# Patient Record
Sex: MALE | Race: WHITE | HISPANIC OR LATINO | URBAN - METROPOLITAN AREA
[De-identification: names, ages, dates, MRNs, and addresses within clinical notes are randomized per-mention and may not be internally consistent; named-entity substitution may affect disease eponyms.]

---

## 2024-11-11 VITALS
RESPIRATION RATE: 16 BRPM | SYSTOLIC BLOOD PRESSURE: 111 MMHG | HEART RATE: 52 BPM | WEIGHT: 184.97 LBS | DIASTOLIC BLOOD PRESSURE: 69 MMHG | OXYGEN SATURATION: 94 % | HEIGHT: 68 IN

## 2024-11-11 RX ORDER — CHLORHEXIDINE GLUCONATE 40 MG/ML
1 SOLUTION TOPICAL ONCE
Refills: 0 | Status: DISCONTINUED | OUTPATIENT
Start: 2024-11-14 | End: 2024-11-15

## 2024-11-11 NOTE — H&P ADULT - HISTORY OF PRESENT ILLNESS
Cardiologist: Dr. Crowder  Escort:  Pharmacy:    65 yo M, active smoker, with a PMH of HTN, HLD, hx of VT (non-sustained 7 beats on OP monitor), chronic systolic CHF (EF 25% per stress) who presented to outpatient cardiologist Dr. Crowder with complaints of L sided chest tightness, lasting 10-15 minutes after resting, experienced on exertion (painting). CP is not experienced at rest and a/w SEPULVEDA to 2 blocks (previously 4-5), b/l leg/feet swelling, occasional palpitations. Denies dizziness, diaphoresis, orthopnea/PND, BRBPR, hematuria, melena. Exercise stress echo 10/8/24: LVEF 25%, evidence of infarction/scar at target HR achieved through exercise protocol. ECG (per MD note): SR, R axis deviation, poor R wave progression V1-4 diffuse, no specific ST abnormalities. In light of patient's risk factors, CCS angina class III sx and abnormal exercise stress echo, patient is referred for cardiac catheterization with possible intervention if clinically indicated.  Cardiologist: Dr. Crowder  Escort: Daughter  Pharmacy: [facility]    63 yo M, active smoker, with a PMH of HTN, HLD, CAD with prior stents, hx of VT (non-sustained 7 beats on OP monitor), chronic systolic CHF (EF 25% per stress) who presented to outpatient cardiologist Dr. Crowder with complaints of L sided chest tightness, lasting 10-15 minutes after resting, experienced on exertion (painting). CP is not experienced at rest and a/w SEPULVEDA to 2 blocks (previously 4-5), b/l leg/feet swelling, occasional palpitations. Denies dizziness, diaphoresis, orthopnea/PND, BRBPR, hematuria, melena. Exercise Stress Echo 10/8/24: LVEF 25%, evidence of infarction/scar at target HR achieved through exercise protocol. ECG (per MD note): SR, R axis deviation, poor R wave progression V1-4 diffuse, no specific ST abnormalities. In light of patient's risk factors, CCS angina class III sx and abnormal exercise stress echo, patient is referred for cardiac catheterization with possible intervention if clinically indicated.  Cardiologist: Dr. Crowder  Escort: Daughter  Pharmacy: [facility]    63 yo M, active smoker, with a PMH of HTN, HLD, CAD with prior stents, hx of VT (non-sustained 7 beats on OP monitor), chronic systolic CHF (EF 25% per stress) who presented to outpatient cardiologist Dr. Crowder with complaints of L sided chest tightness, lasting 10-15 minutes after resting, experienced on exertion (painting). CP is not experienced at rest and a/w SEPULVEDA to 2 blocks (previously 4-5), b/l leg/feet swelling, occasional palpitations. Denies dizziness, diaphoresis, orthopnea/PND, BRBPR, hematuria, melena. Exercise Stress Echo 10/8/24: LVEF 25%, evidence of infarction/scar at target HR achieved through exercise protocol. ECG (per MD note): SR, R axis deviation, poor R wave progression V1-4 diffuse, no specific ST abnormalities. In light of patient's risk factors, CCS angina class III sx and abnormal exercise stress echo, patient is referred for cardiac catheterization with possible intervention if clinically indicated.

## 2024-11-11 NOTE — H&P ADULT - NSHPLABSRESULTS_GEN_ALL_CORE
Routed to PRINCESS Pradhan for review and recommendation.     13.0   6.04  )-----------( 176      ( 14 Nov 2024 11:07 )             40.4       11-14    137  |  103  |  22  ----------------------------<  98  4.4   |  26  |  0.96    Ca    8.8      14 Nov 2024 11:07    TPro  6.8  /  Alb  4.3  /  TBili  0.5  /  DBili  x   /  AST  17  /  ALT  15  /  AlkPhos  108  11-14      PT/INR - ( 14 Nov 2024 11:07 )   PT: 11.9 sec;   INR: 1.02          PTT - ( 14 Nov 2024 11:07 )  PTT:35.0 sec    CARDIAC MARKERS ( 14 Nov 2024 11:07 )  x     / x     / x     / x     / 7.2 ng/mL        Urinalysis Basic - ( 14 Nov 2024 11:07 )    Color: x / Appearance: x / SG: x / pH: x  Gluc: 98 mg/dL / Ketone: x  / Bili: x / Urobili: x   Blood: x / Protein: x / Nitrite: x   Leuk Esterase: x / RBC: x / WBC x   Sq Epi: x / Non Sq Epi: x / Bacteria: x

## 2024-11-11 NOTE — H&P ADULT - ASSESSMENT
63 yo M, active smoker, with a PMH of HTN, HLD, CAD with prior stents, hx of VT (non-sustained 7 beats on OP monitor), chronic systolic CHF (EF 25% per stress) who presents for [facility] with intervention if clinically indicated.    EKG: NSR with TWI in II, III, AVL, V4-V6			  ASA: III  Mallampati class: III   Anginal Class: III    -No Known Allergies    -H/H = 13.0/40.4  . Pt denies BRBPR, hematuria, hematochezia, melena. Pt endorses compliance w/ home aspirin stating last dose was yesterday 11/13/24.  Pt loaded w/ ASA 81 mg x1 and Plavix 600 mg x1  -BUN/Cr = 22/0.96  . EF=25%. +JVD, pt not given fluids as communicated with fellows. B/L lung sounds with wheezes, pt given Duoneb 3ML inh x1 prior to procedure.    Sedation Plan:   Moderate  Patient Is Suitable Candidate For Sedation?     Yes    Risks & benefits of procedure and alternative therapy have been explained to the patient using  #[id number] including but not limited to: allergic reaction, bleeding with possible need for blood transfusion, infection, renal and vascular compromise, limb damage, arrhythmia, stroke, vessel dissection/perforation, myocardial infarction, and emergent CABG. Informed consent obtained at bedside and included in chart.

## 2024-11-11 NOTE — H&P ADULT - NSICDXPASTMEDICALHX_GEN_ALL_CORE_FT
PAST MEDICAL HISTORY:  Chronic systolic congestive heart failure     HLD (hyperlipidemia)     HTN (hypertension)     Smoker

## 2024-11-14 ENCOUNTER — INPATIENT (INPATIENT)
Facility: HOSPITAL | Age: 64
LOS: 0 days | Discharge: HOME | End: 2024-11-15
Attending: STUDENT IN AN ORGANIZED HEALTH CARE EDUCATION/TRAINING PROGRAM | Admitting: STUDENT IN AN ORGANIZED HEALTH CARE EDUCATION/TRAINING PROGRAM
Payer: MEDICARE

## 2024-11-14 LAB
A1C WITH ESTIMATED AVERAGE GLUCOSE RESULT: 5.7 % — HIGH (ref 4–5.6)
ADD ON TEST-SPECIMEN IN LAB: SIGNIFICANT CHANGE UP
ALBUMIN SERPL ELPH-MCNC: 4.3 G/DL — SIGNIFICANT CHANGE UP (ref 3.3–5)
ALP SERPL-CCNC: 108 U/L — SIGNIFICANT CHANGE UP (ref 40–120)
ALT FLD-CCNC: 15 U/L — SIGNIFICANT CHANGE UP (ref 10–45)
ANION GAP SERPL CALC-SCNC: 8 MMOL/L — SIGNIFICANT CHANGE UP (ref 5–17)
APTT BLD: 35 SEC — SIGNIFICANT CHANGE UP (ref 24.5–35.6)
AST SERPL-CCNC: 17 U/L — SIGNIFICANT CHANGE UP (ref 10–40)
BASOPHILS # BLD AUTO: 0.03 K/UL — SIGNIFICANT CHANGE UP (ref 0–0.2)
BASOPHILS NFR BLD AUTO: 0.5 % — SIGNIFICANT CHANGE UP (ref 0–2)
BILIRUB SERPL-MCNC: 0.5 MG/DL — SIGNIFICANT CHANGE UP (ref 0.2–1.2)
BUN SERPL-MCNC: 22 MG/DL — SIGNIFICANT CHANGE UP (ref 7–23)
CALCIUM SERPL-MCNC: 8.8 MG/DL — SIGNIFICANT CHANGE UP (ref 8.4–10.5)
CHLORIDE SERPL-SCNC: 103 MMOL/L — SIGNIFICANT CHANGE UP (ref 96–108)
CHOLEST SERPL-MCNC: 189 MG/DL — SIGNIFICANT CHANGE UP
CK MB CFR SERPL CALC: 7.2 NG/ML — HIGH (ref 0–6.7)
CK SERPL-CCNC: 232 U/L — HIGH (ref 30–200)
CO2 SERPL-SCNC: 26 MMOL/L — SIGNIFICANT CHANGE UP (ref 22–31)
CREAT SERPL-MCNC: 0.96 MG/DL — SIGNIFICANT CHANGE UP (ref 0.5–1.3)
EGFR: 87 ML/MIN/1.73M2 — SIGNIFICANT CHANGE UP
EOSINOPHIL # BLD AUTO: 0.15 K/UL — SIGNIFICANT CHANGE UP (ref 0–0.5)
EOSINOPHIL NFR BLD AUTO: 2.5 % — SIGNIFICANT CHANGE UP (ref 0–6)
ESTIMATED AVERAGE GLUCOSE: 117 MG/DL — HIGH (ref 68–114)
GLUCOSE SERPL-MCNC: 98 MG/DL — SIGNIFICANT CHANGE UP (ref 70–99)
HCT VFR BLD CALC: 40.4 % — SIGNIFICANT CHANGE UP (ref 39–50)
HDLC SERPL-MCNC: 46 MG/DL — SIGNIFICANT CHANGE UP
HGB BLD-MCNC: 13 G/DL — SIGNIFICANT CHANGE UP (ref 13–17)
IMM GRANULOCYTES NFR BLD AUTO: 0.2 % — SIGNIFICANT CHANGE UP (ref 0–0.9)
INR BLD: 1.02 — SIGNIFICANT CHANGE UP (ref 0.85–1.16)
LIPID PNL WITH DIRECT LDL SERPL: 117 MG/DL — HIGH
LYMPHOCYTES # BLD AUTO: 1.99 K/UL — SIGNIFICANT CHANGE UP (ref 1–3.3)
LYMPHOCYTES # BLD AUTO: 32.9 % — SIGNIFICANT CHANGE UP (ref 13–44)
MCHC RBC-ENTMCNC: 30.6 PG — SIGNIFICANT CHANGE UP (ref 27–34)
MCHC RBC-ENTMCNC: 32.2 G/DL — SIGNIFICANT CHANGE UP (ref 32–36)
MCV RBC AUTO: 95.1 FL — SIGNIFICANT CHANGE UP (ref 80–100)
MONOCYTES # BLD AUTO: 0.65 K/UL — SIGNIFICANT CHANGE UP (ref 0–0.9)
MONOCYTES NFR BLD AUTO: 10.8 % — SIGNIFICANT CHANGE UP (ref 2–14)
NEUTROPHILS # BLD AUTO: 3.21 K/UL — SIGNIFICANT CHANGE UP (ref 1.8–7.4)
NEUTROPHILS NFR BLD AUTO: 53.1 % — SIGNIFICANT CHANGE UP (ref 43–77)
NON HDL CHOLESTEROL: 143 MG/DL — HIGH
NRBC # BLD: 0 /100 WBCS — SIGNIFICANT CHANGE UP (ref 0–0)
PLATELET # BLD AUTO: 176 K/UL — SIGNIFICANT CHANGE UP (ref 150–400)
POTASSIUM SERPL-MCNC: 4.4 MMOL/L — SIGNIFICANT CHANGE UP (ref 3.5–5.3)
POTASSIUM SERPL-SCNC: 4.4 MMOL/L — SIGNIFICANT CHANGE UP (ref 3.5–5.3)
PROT SERPL-MCNC: 6.8 G/DL — SIGNIFICANT CHANGE UP (ref 6–8.3)
PROTHROM AB SERPL-ACNC: 11.9 SEC — SIGNIFICANT CHANGE UP (ref 9.9–13.4)
RBC # BLD: 4.25 M/UL — SIGNIFICANT CHANGE UP (ref 4.2–5.8)
RBC # FLD: 14.1 % — SIGNIFICANT CHANGE UP (ref 10.3–14.5)
SODIUM SERPL-SCNC: 137 MMOL/L — SIGNIFICANT CHANGE UP (ref 135–145)
TRIGL SERPL-MCNC: 144 MG/DL — SIGNIFICANT CHANGE UP
WBC # BLD: 6.04 K/UL — SIGNIFICANT CHANGE UP (ref 3.8–10.5)
WBC # FLD AUTO: 6.04 K/UL — SIGNIFICANT CHANGE UP (ref 3.8–10.5)

## 2024-11-14 PROCEDURE — 99152 MOD SED SAME PHYS/QHP 5/>YRS: CPT

## 2024-11-14 PROCEDURE — 93458 L HRT ARTERY/VENTRICLE ANGIO: CPT | Mod: 26,59

## 2024-11-14 PROCEDURE — 92928 PRQ TCAT PLMT NTRAC ST 1 LES: CPT | Mod: RI

## 2024-11-14 PROCEDURE — 0523T NTRAPX C FFR W/3D FUNCJL MAP: CPT

## 2024-11-14 RX ORDER — INFLUENZ VIR VAC TV P-SURF2003 15MCG/.5ML
0.5 SYRINGE (ML) INTRAMUSCULAR ONCE
Refills: 0 | Status: DISCONTINUED | OUTPATIENT
Start: 2024-11-14 | End: 2024-11-15

## 2024-11-14 RX ORDER — DAPAGLIFLOZIN 10 MG/1
1 TABLET, FILM COATED ORAL
Refills: 0 | DISCHARGE

## 2024-11-14 RX ORDER — ASPIRIN/MAG CARB/ALUMINUM AMIN 325 MG
81 TABLET ORAL DAILY
Refills: 0 | Status: DISCONTINUED | OUTPATIENT
Start: 2024-11-15 | End: 2024-11-15

## 2024-11-14 RX ORDER — CLOPIDOGREL 75 MG/1
600 TABLET ORAL ONCE
Refills: 0 | Status: COMPLETED | OUTPATIENT
Start: 2024-11-14 | End: 2024-11-14

## 2024-11-14 RX ORDER — CLOPIDOGREL 75 MG/1
75 TABLET ORAL DAILY
Refills: 0 | Status: DISCONTINUED | OUTPATIENT
Start: 2024-11-15 | End: 2024-11-15

## 2024-11-14 RX ORDER — ASPIRIN/MAG CARB/ALUMINUM AMIN 325 MG
81 TABLET ORAL ONCE
Refills: 0 | Status: COMPLETED | OUTPATIENT
Start: 2024-11-14 | End: 2024-11-14

## 2024-11-14 RX ORDER — ICOSAPENT ETHYL 1 G/1
2 CAPSULE, LIQUID FILLED ORAL
Refills: 0 | DISCHARGE

## 2024-11-14 RX ORDER — CARVEDILOL 25 MG/1
3.12 TABLET, FILM COATED ORAL EVERY 12 HOURS
Refills: 0 | Status: DISCONTINUED | OUTPATIENT
Start: 2024-11-14 | End: 2024-11-15

## 2024-11-14 RX ORDER — IPRATROPIUM BROMIDE AND ALBUTEROL SULFATE .5; 2.5 MG/3ML; MG/3ML
3 SOLUTION RESPIRATORY (INHALATION) EVERY 6 HOURS
Refills: 0 | Status: DISCONTINUED | OUTPATIENT
Start: 2024-11-14 | End: 2024-11-15

## 2024-11-14 RX ORDER — SODIUM CHLORIDE 9 MG/ML
1000 INJECTION, SOLUTION INTRAMUSCULAR; INTRAVENOUS; SUBCUTANEOUS
Refills: 0 | Status: DISCONTINUED | OUTPATIENT
Start: 2024-11-14 | End: 2024-11-15

## 2024-11-14 RX ORDER — AMLODIPINE BESYLATE 10 MG
1 TABLET ORAL
Refills: 0 | DISCHARGE

## 2024-11-14 RX ORDER — SACUBITRIL AND VALSARTAN 97; 103 MG/1; MG/1
1 TABLET, FILM COATED ORAL
Refills: 0 | DISCHARGE

## 2024-11-14 RX ORDER — SACUBITRIL AND VALSARTAN 97; 103 MG/1; MG/1
1 TABLET, FILM COATED ORAL EVERY 12 HOURS
Refills: 0 | Status: DISCONTINUED | OUTPATIENT
Start: 2024-11-14 | End: 2024-11-15

## 2024-11-14 RX ADMIN — SODIUM CHLORIDE 75 MILLILITER(S): 9 INJECTION, SOLUTION INTRAMUSCULAR; INTRAVENOUS; SUBCUTANEOUS at 17:12

## 2024-11-14 RX ADMIN — CLOPIDOGREL 600 MILLIGRAM(S): 75 TABLET ORAL at 12:28

## 2024-11-14 RX ADMIN — Medication 40 MILLIGRAM(S): at 21:53

## 2024-11-14 RX ADMIN — IPRATROPIUM BROMIDE AND ALBUTEROL SULFATE 3 MILLILITER(S): .5; 2.5 SOLUTION RESPIRATORY (INHALATION) at 13:13

## 2024-11-14 RX ADMIN — CARVEDILOL 3.12 MILLIGRAM(S): 25 TABLET, FILM COATED ORAL at 19:29

## 2024-11-14 RX ADMIN — SODIUM CHLORIDE 75 MILLILITER(S): 9 INJECTION, SOLUTION INTRAMUSCULAR; INTRAVENOUS; SUBCUTANEOUS at 18:12

## 2024-11-14 RX ADMIN — Medication 81 MILLIGRAM(S): at 12:28

## 2024-11-14 RX ADMIN — SACUBITRIL AND VALSARTAN 1 TABLET(S): 97; 103 TABLET, FILM COATED ORAL at 19:29

## 2024-11-14 RX ADMIN — IPRATROPIUM BROMIDE AND ALBUTEROL SULFATE 3 MILLILITER(S): .5; 2.5 SOLUTION RESPIRATORY (INHALATION) at 19:30

## 2024-11-14 NOTE — PATIENT PROFILE ADULT - FALL HARM RISK - HARM RISK INTERVENTIONS

## 2024-11-14 NOTE — DISCHARGE NOTE PROVIDER - HOSPITAL COURSE
**INCOMPLETE**    64M, active smoker, with a PMH of HTN, HLD, CAD with prior stents, hx of VT (non-sustained 7 beats on OP monitor), chronic systolic CHF (EF 25% per stress) who initially presented to his outpatient cardiologist c/o CP a/w SEPULVEDA, B/L LE edema, and palpitations, now presented to [facility] for LHC with intervention if clinically indicated.    Past Testing:   - Exercise Stress Echo 10/8/24: LVEF 25%, evidence of infarction/scar at target HR achieved through exercise protocol.   - ECG (per MD note): SR, R axis deviation, poor R wave progression V1-4 diffuse, no specific ST abnormalities.    Pt now s/p cardiac cath (11/14/24): BILL x 1 Ramus 80%. Otherwise MLI. EDP 6. ACCESS: RTR. IC/fellow: Dr. Crowder/Dr. Clayton.     Pt admitted to cardiac tele for further monitoring overnight. Pt seen and examined at bedside with no acute complaints or events overnight. VSS. Physical exam unremarkable. Right radial access site stable - no hematoma, c/d/i, radial pulse intact to baseline. Labs and telemetry reviewed. Pt stable for discharge as discussed with Dr. Solis. Pt advised to f/u with Dr. Crowder in 1-2 weeks.     GLP-1 receptor agonist/SGLT2 inhibitor meds discussed w/ patients and encouraged to discuss further with PMD or Endocrinologist at next visit.  Pt discharge copies detail cardiovascular history, medications, testing/treatments, OR patient has created a patient portal account and instructed to provide their records at their 1st appointment. Counseled on the importance of smoking cessation.    Cardiac Rehab (Post PCI):            *Education on benefits of Cardiac Rehab provided to patient: YES         *Referral and Prescription Given for Cardiac Rehab: YES         *Pt given list of locations & instructed to contact their insurance company to review list of participating providers    Discharge medications:  - DAPT: ASA 81 mg PO qd/Plavix 75 mg PO qd  - Statin: atorvastatin 40 mg PO qhs ()  - carvedilol 3.125 mg PO BID  - Vascepa 1 g (2 cap(s) BID)  - Entresto 24 mg-26 mg PO BID   **INCOMPLETE**    64M, active smoker, with a PMH of HTN, HLD, CAD with prior stents, hx of VT (non-sustained 7 beats on OP monitor), chronic systolic CHF (EF 25% per stress) who initially presented to his outpatient cardiologist c/o CP a/w SEPULVEDA, B/L LE edema, and palpitations, now presented to [facility] for LHC with intervention if clinically indicated.    Past Testing:   - Exercise Stress Echo 10/8/24: LVEF 25%, evidence of infarction/scar at target HR achieved through exercise protocol.   - ECG (per MD note): SR, R axis deviation, poor R wave progression V1-4 diffuse, no specific ST abnormalities.    Pt now s/p cardiac cath (11/14/24): BILL x 1 Ramus 80%. Otherwise MLI. EDP 6. ACCESS: RTR. IC/fellow: Dr. Crowder/Dr. Clayton.     Pt admitted to cardiac tele for further monitoring overnight. Pt seen and examined at bedside with no acute complaints or events overnight. VSS. Physical exam unremarkable. Right radial access site stable - no hematoma, c/d/i, radial pulse intact to baseline. Labs and telemetry reviewed. Pt stable for discharge as discussed with Dr. Solis. Pt advised to f/u with Dr. Crowder in 1-2 weeks.     GLP-1 receptor agonist/SGLT2 inhibitor meds discussed w/ patients and encouraged to discuss further with PMD or Endocrinologist at next visit.  Pt discharge copies detail cardiovascular history, medications, testing/treatments, OR patient has created a patient portal account and instructed to provide their records at their 1st appointment. Counseled on the importance of smoking cessation.    Cardiac Rehab (Post PCI):            *Education on benefits of Cardiac Rehab provided to patient: YES         *Referral and Prescription Given for Cardiac Rehab: YES         *Pt given list of locations & instructed to contact their insurance company to review list of participating providers    Discharge medications:  - DAPT: ASA 81 mg PO qd/Plavix 75 mg PO qd  - Statin: start atorvastatin 40 mg PO qhs ()  - carvedilol 3.125 mg PO BID  - Vascepa 1 g (2 cap(s) BID)  - Entresto 24 mg-26 mg PO BID   **INCOMPLETE**    64M, active smoker, with a PMH of HTN, HLD, CAD with prior stents, hx of VT (non-sustained 7 beats on OP monitor), chronic systolic CHF (EF 25% per stress) who initially presented to his outpatient cardiologist c/o CP a/w SEPULVEDA, B/L LE edema, and palpitations, now presented to [facility] for LHC with intervention if clinically indicated.    Past Testing:   - Exercise Stress Echo 10/8/24: LVEF 25%, evidence of infarction/scar at target HR achieved through exercise protocol.   - ECG (per MD note): SR, R axis deviation, poor R wave progression V1-4 diffuse, no specific ST abnormalities.    Pt now s/p cardiac cath (11/14/24): BILL x 1 Ramus 80%. Otherwise MLI. EDP 6. ACCESS: RTR. IC/fellow: Dr. Crowder/Dr. Clayton.     Patient was noted to have some ventricular ectopy on tele review. Increased BB to coreg 6.25mg bid for this reason.      Pt admitted to cardiac tele for further monitoring overnight. Pt seen and examined at bedside with no acute complaints or events overnight. VSS. Physical exam unremarkable. Right radial access site stable - no hematoma, c/d/i, radial pulse intact to baseline. Labs and telemetry reviewed. Pt stable for discharge as discussed with Dr. Solis. Pt advised to f/u with Dr. Crowder in 1-2 weeks.     GLP-1 receptor agonist/SGLT2 inhibitor meds discussed w/ patients and encouraged to discuss further with PMD or Endocrinologist at next visit.  Pt discharge copies detail cardiovascular history, medications, testing/treatments, OR patient has created a patient portal account and instructed to provide their records at their 1st appointment. Counseled on the importance of smoking cessation.    Cardiac Rehab (Post PCI):            *Education on benefits of Cardiac Rehab provided to patient: YES         *Referral and Prescription Given for Cardiac Rehab: YES         *Pt given list of locations & instructed to contact their insurance company to review list of participating providers    Discharge medications:  - DAPT: ASA 81 mg PO qd/Plavix 75 mg PO qd  - Statin: start atorvastatin 40 mg PO qhs ()  - Increase carvedilol 6.25 mg PO BID  - Vascepa 1 g (2 cap(s) BID)  - Entresto 24 mg-26 mg PO BID   64M, active smoker, with a PMH of HTN, HLD, CAD with prior stents, hx of VT (non-sustained 7 beats on OP monitor), chronic systolic CHF (EF 25% per stress) who initially presented to his outpatient cardiologist c/o CP a/w SEPULVEDA, B/L LE edema, and palpitations, now presented to [facility] for LHC with intervention if clinically indicated.    Past Testing:   - Exercise Stress Echo 10/8/24: LVEF 25%, evidence of infarction/scar at target HR achieved through exercise protocol.   - ECG (per MD note): SR, R axis deviation, poor R wave progression V1-4 diffuse, no specific ST abnormalities.    Pt now s/p cardiac cath (11/14/24): BILL x 1 Ramus 80%. Otherwise MLI. EDP 6. ACCESS: RTR. IC/fellow: Dr. Crowder/Dr. Clayton.     Patient was noted to have some ventricular ectopy on tele review. Increased BB to coreg 6.25mg bid for this reason.      Pt admitted to cardiac tele for further monitoring overnight. Pt seen and examined at bedside with no acute complaints or events overnight. VSS. Physical exam unremarkable. Right radial access site stable - no hematoma, c/d/i, radial pulse intact to baseline. Labs and telemetry reviewed. Pt stable for discharge as discussed with Dr. Solis. Pt advised to f/u with Dr. Crowder in 1-2 weeks.     GLP-1 receptor agonist/SGLT2 inhibitor meds discussed w/ patients and encouraged to discuss further with PMD or Endocrinologist at next visit.  Pt discharge copies detail cardiovascular history, medications, testing/treatments, OR patient has created a patient portal account and instructed to provide their records at their 1st appointment. Counseled on the importance of smoking cessation.    Cardiac Rehab (Post PCI):            *Education on benefits of Cardiac Rehab provided to patient: YES         *Referral and Prescription Given for Cardiac Rehab: YES         *Pt given list of locations & instructed to contact their insurance company to review list of participating providers    Discharge medications:  - DAPT: ASA 81 mg PO qd/Plavix 75 mg PO qd  - Statin: start atorvastatin 40 mg PO qhs ()  - Increase carvedilol 6.25 mg PO BID  - Vascepa 1 g (2 cap(s) BID)  - Entresto 24 mg-26 mg PO BID

## 2024-11-14 NOTE — DISCHARGE NOTE PROVIDER - CARE PROVIDER_API CALL
Luisito Ruth  Interventional Cardiology  [address] [place] [id number]  Phone: [phone]  Fax: [phone]  Established Patient  Follow Up Time: 1 week

## 2024-11-14 NOTE — DISCHARGE NOTE PROVIDER - ATTENDING DISCHARGE PHYSICAL EXAMINATION:
MR. Angulo is a 64M with history of HTN, DM, CAD, HFrEF presented with dyspnea and palpitations underwent stress test that is abnormal and sent for [facility] s/p PCI to [facility] by RRA access.     Exam:  NAD  JVP  CTA b/l  RRA access site stable, intact pulse. No hematoma.   WWP , no edema in LE    CAD- DAPT, statin  HFrEF- Entresto, Coreg increase  - PVCs- increased coreg as above  HLD_ Statin increase, vascepa

## 2024-11-14 NOTE — DISCHARGE NOTE PROVIDER - NSDCMRMEDTOKEN_GEN_ALL_CORE_FT
aspirin 81 mg oral tablet: 1 tab(s) orally once a day  carvedilol 3.125 mg oral tablet: 1 tab(s) orally 2 times a day  Entresto 24 mg-26 mg oral tablet: 1 tab(s) orally 2 times a day  Vascepa 1 g oral capsule: 2 cap(s) orally 2 times a day   Aspirin EC 81 mg oral delayed release tablet: 1 tab(s) orally once a day  atorvastatin 40 mg oral tablet: 1 tab(s) orally once a day (at bedtime)  cardiac rehab, 3x/week x 12 weeks for diagnosis of CAD. Cardiologist Dr. Crowder: cardiac rehab, 3x/week x 12 weeks for diagnosis of CAD. Cardiologist Dr. Crowder  carvedilol 3.125 mg oral tablet: 1 tab(s) orally 2 times a day  clopidogrel 75 mg oral tablet: 1 tab(s) orally once a day  Entresto 24 mg-26 mg oral tablet: 1 tab(s) orally 2 times a day  Vascepa 1 g oral capsule: 2 cap(s) orally 2 times a day   Aspirin EC 81 mg oral delayed release tablet: 1 tab(s) orally once a day  atorvastatin 40 mg oral tablet: 1 tab(s) orally once a day (at bedtime)  cardiac rehab, 3x/week x 12 weeks for diagnosis of CAD. Cardiologist Dr. Crowder: cardiac rehab, 3x/week x 12 weeks for diagnosis of CAD. Cardiologist Dr. Crowder  clopidogrel 75 mg oral tablet: 1 tab(s) orally once a day  Coreg 6.25 mg oral tablet: 1 tab(s) orally 2 times a day  Entresto 24 mg-26 mg oral tablet: 1 tab(s) orally 2 times a day  Vascepa 1 g oral capsule: 2 cap(s) orally 2 times a day

## 2024-11-14 NOTE — DISCHARGE NOTE PROVIDER - NSDCCPCAREPLAN_GEN_ALL_CORE_FT
PRINCIPAL DISCHARGE DIAGNOSIS  Diagnosis: CAD (coronary artery disease)  Assessment and Plan of Treatment: You were found to have blockages in the arteries of your heart, also known as Coronary Artery Disease. You underwent a cardiac catheterization on 11/14/24 and received a stent to the ramus artery.   PLEASE CONTINUE ASPIRIN 81MG DAILY AND PLAVIX 75MG DAILY. DO NOT STOP THESE MEDICATIONS FOR ANY REASON AS THEY ARE KEEPING YOUR STENT OPEN AND PREVENTING A HEART ATTACK. Aspirin and Plavix can put you at increased risk of bleeding; please avoid NSAIDS (such as Motrin, Advil, Ibuprofen, Naproxen, or Aleve, as these medications may further your risk of bleeding. Avoid strenuous activity or heavy lifting anything more than 5lbs for the next five days. Do not take a bath or swim for the next five days; you may shower. For any bleeding or hematoma formation (hardened blood collection under the skin) at the access site of your right wrist, please hold pressure and go to the emergency room.   SEEK IMMEDIATE MEDICAL CARE IF YOU HAVE ANY OF THE FOLLOWING SYMPTOMS: worsening chest pain, racing heart beat, unexplained jaw/neck/back pain, severe abdominal pain, shortness of breath, dizziness or lightheadedness, fainting, sweaty or clammy skin, vomiting, or coughing up blood. These symptoms may represent a serious problem that is an emergency. Do not wait to see if the symptoms will go away. Get medical help right away. Call 911 and do not drive yourself to the hospital. Please follow up with Dr. Crowder in 1-2 weeks.      SECONDARY DISCHARGE DIAGNOSES  Diagnosis: Chronic HFrEF (heart failure with reduced ejection fraction)  Assessment and Plan of Treatment: You have a weak heart, also known as Congestive Heart Failure (CHF). Heart failure is a condition in which the heart does not pump or fill with blood well. As a result, the heart lags behind in its job of moving blood throughout the body. This can lead to symptoms such as swelling, trouble breathing, and feeling tired. Your Ejection Fraction (EF) is 25%, a normal EF is 55-60%.  -Please continue carvedilol 3.125 mg twice daily and Entresto 24/26 mg twice daily to help strenghten your heart muscle  -Avoid drinking more than 1.5L of fluid daily and maintain a low salt diet (max 2grams daily).  -Please weigh yourself daily, for any significant increases in daily weight of 2lbs/day or 5lbs/week with associated swelling in the legs or abdomen and/or shortness of breath, please call your doctor or go to the emergency room.  -Please continue to follow up with Dr. Crowder    Diagnosis: HTN (hypertension)  Assessment and Plan of Treatment: Hypertension, commonly called high blood pressure, is when the force of blood pumping through your arteries is too strong. Hypertension forces your heart to work harder to pump blood. Your arteries may become narrow or stiff. Having untreated or uncontrolled hypertension for a long period of time can cause heart attack, stroke, kidney disease, and other problems.  Please continue carvedilol 3.125 mg twice daily and Entresto 24/26 mg twice daily as listed to keep your blood pressure controlled. For blood pressure that is too high or too low please see your doctor or go to the emergency room as necessary. Please continue to follow up with your cardiologist or primary care physician in order to discuss lifestyle modifications or the initiation of additional blood pressure lowering medications.    Diagnosis: HLD (hyperlipidemia)  Assessment and Plan of Treatment: Your LDL (unhealthy cholesterol) was 117 on this admission, and the goal is LDL at least 70 for the prevention of future heart disease-related events. Please START atorvastatin 40 mg once a day at bedtime and continue Vascepa 1g (2 capsules twice daily) to keep your cholesterol low. High cholesterol contributes to heart disease. Please continue to follow up with Dr. Crowder and discuss the initiation of additional cholesterol-lowering agents in the future as necessary.    Diagnosis: Prediabetes  Assessment and Plan of Treatment: Your Hemoglobin A1c is 5.7%, which is considered pre-diabetes (A1c 5.7%-6.4%). This number measures your average blood sugar level over the last three months. Maintain a low carbohydrate, low sugar diet, exercise, and follow up with your Endocrinologist/Primary Care Doctor to discuss the implementation of lifestyle modifications and the possible initiation of a diabetes medication regimen in the future.    Diagnosis: History of ventricular tachycardia  Assessment and Plan of Treatment: You have a known history of venrticular tachycardia. Please continue carvedilol 3.125 mg twice daily.    Diagnosis: Encounter for cardiac rehabilitation  Assessment and Plan of Treatment: - We have provided you with a prescription for cardiac rehab which is a medically supervised exercise program for your heart and has been shown to improve the quantity and quality of life of people with heart disease like yours.   - You should attend cardiac rehab 3 times per week for 12 weeks.   - We have provided you with a list of nearby facilities.   -Please call your insurance carrier to determine which of these facilities are covered under your plan.   - Please bring this prescription with you to your follow up appointment with your cardiologist who can then further assist you to enroll into a cardiac rehab program.    Diagnosis: Encounter for smoking cessation counseling  Assessment and Plan of Treatment: Smoking cessation is one of the most important actions people who smoke can take to reduce their risk for cardiovascular disease.  This is true for all people who smoke, regardless of age or smoking duration and intensity.  The cardiovascular benefits of smoking cessation include: reduces markers of inflammation and risk of blood clots. Improves high-density lipoprotein cholesterol (HDL-C) levels. Reduces the risk of disease, recurent events, and death from cardiovascular disease. Reduces the risk of disease and death from stroke. Reduces the risk of abdominal aortic aneurysm, with risk reduction increasing with time since cessation. May reduce the risk of atrial fibrillation, sudden cardiac death, heart failure, venous thromboembolism, and peripheral arterial disease.     PRINCIPAL DISCHARGE DIAGNOSIS  Diagnosis: CAD (coronary artery disease)  Assessment and Plan of Treatment: You were found to have blockages in the arteries of your heart, also known as Coronary Artery Disease. You underwent a cardiac catheterization on 11/14/24 and received a stent to the ramus artery.   PLEASE CONTINUE ASPIRIN 81MG DAILY AND PLAVIX 75MG DAILY. DO NOT STOP THESE MEDICATIONS FOR ANY REASON AS THEY ARE KEEPING YOUR STENT OPEN AND PREVENTING A HEART ATTACK. Aspirin and Plavix can put you at increased risk of bleeding; please avoid NSAIDS (such as Motrin, Advil, Ibuprofen, Naproxen, or Aleve, as these medications may further your risk of bleeding. Avoid strenuous activity or heavy lifting anything more than 5lbs for the next five days. Do not take a bath or swim for the next five days; you may shower. For any bleeding or hematoma formation (hardened blood collection under the skin) at the access site of your right wrist, please hold pressure and go to the emergency room.   SEEK IMMEDIATE MEDICAL CARE IF YOU HAVE ANY OF THE FOLLOWING SYMPTOMS: worsening chest pain, racing heart beat, unexplained jaw/neck/back pain, severe abdominal pain, shortness of breath, dizziness or lightheadedness, fainting, sweaty or clammy skin, vomiting, or coughing up blood. These symptoms may represent a serious problem that is an emergency. Do not wait to see if the symptoms will go away. Get medical help right away. Call 911 and do not drive yourself to the hospital. Please follow up with Dr. Crowder in 1-2 weeks.      SECONDARY DISCHARGE DIAGNOSES  Diagnosis: Chronic HFrEF (heart failure with reduced ejection fraction)  Assessment and Plan of Treatment: You have a weak heart, also known as Congestive Heart Failure (CHF). Heart failure is a condition in which the heart does not pump or fill with blood well. As a result, the heart lags behind in its job of moving blood throughout the body. This can lead to symptoms such as swelling, trouble breathing, and feeling tired. Your Ejection Fraction (EF) is 25%, a normal EF is 55-60%.  -Please continue carvedilol 3.125 mg twice daily and Entresto 24/26 mg twice daily to help strenghten your heart muscle  -Avoid drinking more than 1.5L of fluid daily and maintain a low salt diet (max 2grams daily).  -Please weigh yourself daily, for any significant increases in daily weight of 2lbs/day or 5lbs/week with associated swelling in the legs or abdomen and/or shortness of breath, please call your doctor or go to the emergency room.  -Please continue to follow up with Dr. Crowder    Diagnosis: HTN (hypertension)  Assessment and Plan of Treatment: Hypertension, commonly called high blood pressure, is when the force of blood pumping through your arteries is too strong. Hypertension forces your heart to work harder to pump blood. Your arteries may become narrow or stiff. Having untreated or uncontrolled hypertension for a long period of time can cause heart attack, stroke, kidney disease, and other problems.  Please continue carvedilol 3.125 mg twice daily and Entresto 24/26 mg twice daily as listed to keep your blood pressure controlled. For blood pressure that is too high or too low please see your doctor or go to the emergency room as necessary. Please continue to follow up with your cardiologist or primary care physician in order to discuss lifestyle modifications or the initiation of additional blood pressure lowering medications.    Diagnosis: HLD (hyperlipidemia)  Assessment and Plan of Treatment: Your LDL (unhealthy cholesterol) was 117 on this admission, and the goal is LDL at least 70 for the prevention of future heart disease-related events. Please START atorvastatin 40 mg once a day at bedtime and continue Vascepa 1g (2 capsules twice daily) to keep your cholesterol low. High cholesterol contributes to heart disease. Please continue to follow up with Dr. Crowder and discuss the initiation of additional cholesterol-lowering agents in the future as necessary.    Diagnosis: History of ventricular tachycardia  Assessment and Plan of Treatment: You have a known history of venrticular tachycardia. Please continue carvedilol 3.125 mg twice daily.    Diagnosis: Encounter for cardiac rehabilitation  Assessment and Plan of Treatment: - We have provided you with a prescription for cardiac rehab which is a medically supervised exercise program for your heart and has been shown to improve the quantity and quality of life of people with heart disease like yours.   - You should attend cardiac rehab 3 times per week for 12 weeks.   - We have provided you with a list of nearby facilities.   -Please call your insurance carrier to determine which of these facilities are covered under your plan.   - Please bring this prescription with you to your follow up appointment with your cardiologist who can then further assist you to enroll into a cardiac rehab program.    Diagnosis: Encounter for smoking cessation counseling  Assessment and Plan of Treatment: Smoking cessation is one of the most important actions people who smoke can take to reduce their risk for cardiovascular disease.  This is true for all people who smoke, regardless of age or smoking duration and intensity.  The cardiovascular benefits of smoking cessation include: reduces markers of inflammation and risk of blood clots. Improves high-density lipoprotein cholesterol (HDL-C) levels. Reduces the risk of disease, recurent events, and death from cardiovascular disease. Reduces the risk of disease and death from stroke. Reduces the risk of abdominal aortic aneurysm, with risk reduction increasing with time since cessation. May reduce the risk of atrial fibrillation, sudden cardiac death, heart failure, venous thromboembolism, and peripheral arterial disease.    Diagnosis: Prediabetes  Assessment and Plan of Treatment: Your Hemoglobin A1c is 5.7%, which is considered pre-diabetes (A1c 5.7%-6.4%). This number measures your average blood sugar level over the last three months. Maintain a low carbohydrate, low sugar diet, exercise, and follow up with your Endocrinologist/Primary Care Doctor to discuss the implementation of lifestyle modifications and the possible initiation of a diabetes medication regimen in the future.     PRINCIPAL DISCHARGE DIAGNOSIS  Diagnosis: CAD (coronary artery disease)  Assessment and Plan of Treatment: You were found to have blockages in the arteries of your heart, also known as Coronary Artery Disease. You underwent a cardiac catheterization on 11/14/24 and received a stent to the ramus artery.   PLEASE CONTINUE ASPIRIN 81MG DAILY AND PLAVIX 75MG DAILY. DO NOT STOP THESE MEDICATIONS FOR ANY REASON AS THEY ARE KEEPING YOUR STENT OPEN AND PREVENTING A HEART ATTACK. Aspirin and Plavix can put you at increased risk of bleeding; please avoid NSAIDS (such as Motrin, Advil, Ibuprofen, Naproxen, or Aleve, as these medications may further your risk of bleeding. Avoid strenuous activity or heavy lifting anything more than 5lbs for the next five days. Do not take a bath or swim for the next five days; you may shower. For any bleeding or hematoma formation (hardened blood collection under the skin) at the access site of your right wrist, please hold pressure and go to the emergency room.   SEEK IMMEDIATE MEDICAL CARE IF YOU HAVE ANY OF THE FOLLOWING SYMPTOMS: worsening chest pain, racing heart beat, unexplained jaw/neck/back pain, severe abdominal pain, shortness of breath, dizziness or lightheadedness, fainting, sweaty or clammy skin, vomiting, or coughing up blood. These symptoms may represent a serious problem that is an emergency. Do not wait to see if the symptoms will go away. Get medical help right away. Call 911 and do not drive yourself to the hospital. Please follow up with Dr. Crowder in 1-2 weeks.      SECONDARY DISCHARGE DIAGNOSES  Diagnosis: Chronic HFrEF (heart failure with reduced ejection fraction)  Assessment and Plan of Treatment: You have a weak heart, also known as Congestive Heart Failure (CHF). Heart failure is a condition in which the heart does not pump or fill with blood well. As a result, the heart lags behind in its job of moving blood throughout the body. This can lead to symptoms such as swelling, trouble breathing, and feeling tired. Your Ejection Fraction (EF) is 25%, a normal EF is 55-60%.  -Please INCREASE your carvedilol 6.25 mg twice daily and Entresto 24/26 mg twice daily to help strenghten your heart muscle  -Avoid drinking more than 1.5L of fluid daily and maintain a low salt diet (max 2grams daily).  -Please weigh yourself daily, for any significant increases in daily weight of 2lbs/day or 5lbs/week with associated swelling in the legs or abdomen and/or shortness of breath, please call your doctor or go to the emergency room.  -Please continue to follow up with Dr. Crowder    Diagnosis: HTN (hypertension)  Assessment and Plan of Treatment: Hypertension, commonly called high blood pressure, is when the force of blood pumping through your arteries is too strong. Hypertension forces your heart to work harder to pump blood. Your arteries may become narrow or stiff. Having untreated or uncontrolled hypertension for a long period of time can cause heart attack, stroke, kidney disease, and other problems.  Please INCREASE carvedilol 6.25 mg twice daily and Entresto 24/26 mg twice daily as listed to keep your blood pressure controlled. For blood pressure that is too high or too low please see your doctor or go to the emergency room as necessary. Please continue to follow up with your cardiologist or primary care physician in order to discuss lifestyle modifications or the initiation of additional blood pressure lowering medications.    Diagnosis: HLD (hyperlipidemia)  Assessment and Plan of Treatment: Your LDL (unhealthy cholesterol) was 117 on this admission, and the goal is LDL at least 70 for the prevention of future heart disease-related events. Please START atorvastatin 40 mg once a day at bedtime and continue Vascepa 1g (2 capsules twice daily) to keep your cholesterol low. High cholesterol contributes to heart disease. Please continue to follow up with Dr. Crowder and discuss the initiation of additional cholesterol-lowering agents in the future as necessary.    Diagnosis: History of ventricular tachycardia  Assessment and Plan of Treatment: You have a known history of venrticular tachycardia. Please continue carvedilol 3.125 mg twice daily.    Diagnosis: Encounter for cardiac rehabilitation  Assessment and Plan of Treatment: - We have provided you with a prescription for cardiac rehab which is a medically supervised exercise program for your heart and has been shown to improve the quantity and quality of life of people with heart disease like yours.   - You should attend cardiac rehab 3 times per week for 12 weeks.   - We have provided you with a list of nearby facilities.   -Please call your insurance carrier to determine which of these facilities are covered under your plan.   - Please bring this prescription with you to your follow up appointment with your cardiologist who can then further assist you to enroll into a cardiac rehab program.    Diagnosis: Encounter for smoking cessation counseling  Assessment and Plan of Treatment: Smoking cessation is one of the most important actions people who smoke can take to reduce their risk for cardiovascular disease.  This is true for all people who smoke, regardless of age or smoking duration and intensity.  The cardiovascular benefits of smoking cessation include: reduces markers of inflammation and risk of blood clots. Improves high-density lipoprotein cholesterol (HDL-C) levels. Reduces the risk of disease, recurent events, and death from cardiovascular disease. Reduces the risk of disease and death from stroke. Reduces the risk of abdominal aortic aneurysm, with risk reduction increasing with time since cessation. May reduce the risk of atrial fibrillation, sudden cardiac death, heart failure, venous thromboembolism, and peripheral arterial disease.    Diagnosis: Prediabetes  Assessment and Plan of Treatment: Your Hemoglobin A1c is 5.7%, which is considered pre-diabetes (A1c 5.7%-6.4%). This number measures your average blood sugar level over the last three months. Maintain a low carbohydrate, low sugar diet, exercise, and follow up with your Endocrinologist/Primary Care Doctor to discuss the implementation of lifestyle modifications and the possible initiation of a diabetes medication regimen in the future.

## 2024-11-15 VITALS
HEART RATE: 70 BPM | DIASTOLIC BLOOD PRESSURE: 72 MMHG | OXYGEN SATURATION: 95 % | SYSTOLIC BLOOD PRESSURE: 122 MMHG | RESPIRATION RATE: 18 BRPM | TEMPERATURE: 97 F

## 2024-11-15 LAB
ALBUMIN SERPL ELPH-MCNC: 3.9 G/DL — SIGNIFICANT CHANGE UP (ref 3.3–5)
ALP SERPL-CCNC: 103 U/L — SIGNIFICANT CHANGE UP (ref 40–120)
ALT FLD-CCNC: 12 U/L — SIGNIFICANT CHANGE UP (ref 10–45)
ANION GAP SERPL CALC-SCNC: 9 MMOL/L — SIGNIFICANT CHANGE UP (ref 5–17)
AST SERPL-CCNC: 15 U/L — SIGNIFICANT CHANGE UP (ref 10–40)
BILIRUB SERPL-MCNC: 0.4 MG/DL — SIGNIFICANT CHANGE UP (ref 0.2–1.2)
BUN SERPL-MCNC: 22 MG/DL — SIGNIFICANT CHANGE UP (ref 7–23)
CALCIUM SERPL-MCNC: 8.7 MG/DL — SIGNIFICANT CHANGE UP (ref 8.4–10.5)
CHLORIDE SERPL-SCNC: 103 MMOL/L — SIGNIFICANT CHANGE UP (ref 96–108)
CO2 SERPL-SCNC: 24 MMOL/L — SIGNIFICANT CHANGE UP (ref 22–31)
CREAT SERPL-MCNC: 0.96 MG/DL — SIGNIFICANT CHANGE UP (ref 0.5–1.3)
EGFR: 87 ML/MIN/1.73M2 — SIGNIFICANT CHANGE UP
GLUCOSE SERPL-MCNC: 99 MG/DL — SIGNIFICANT CHANGE UP (ref 70–99)
HCT VFR BLD CALC: 40.2 % — SIGNIFICANT CHANGE UP (ref 39–50)
HGB BLD-MCNC: 12.9 G/DL — LOW (ref 13–17)
MAGNESIUM SERPL-MCNC: 2.2 MG/DL — SIGNIFICANT CHANGE UP (ref 1.6–2.6)
MCHC RBC-ENTMCNC: 30.6 PG — SIGNIFICANT CHANGE UP (ref 27–34)
MCHC RBC-ENTMCNC: 32.1 G/DL — SIGNIFICANT CHANGE UP (ref 32–36)
MCV RBC AUTO: 95.3 FL — SIGNIFICANT CHANGE UP (ref 80–100)
NRBC # BLD: 0 /100 WBCS — SIGNIFICANT CHANGE UP (ref 0–0)
PLATELET # BLD AUTO: 168 K/UL — SIGNIFICANT CHANGE UP (ref 150–400)
POTASSIUM SERPL-MCNC: 4.2 MMOL/L — SIGNIFICANT CHANGE UP (ref 3.5–5.3)
POTASSIUM SERPL-SCNC: 4.2 MMOL/L — SIGNIFICANT CHANGE UP (ref 3.5–5.3)
PROT SERPL-MCNC: 6.4 G/DL — SIGNIFICANT CHANGE UP (ref 6–8.3)
RBC # BLD: 4.22 M/UL — SIGNIFICANT CHANGE UP (ref 4.2–5.8)
RBC # FLD: 14.2 % — SIGNIFICANT CHANGE UP (ref 10.3–14.5)
SODIUM SERPL-SCNC: 136 MMOL/L — SIGNIFICANT CHANGE UP (ref 135–145)
WBC # BLD: 6.39 K/UL — SIGNIFICANT CHANGE UP (ref 3.8–10.5)
WBC # FLD AUTO: 6.39 K/UL — SIGNIFICANT CHANGE UP (ref 3.8–10.5)

## 2024-11-15 PROCEDURE — 99239 HOSP IP/OBS DSCHRG MGMT >30: CPT

## 2024-11-15 PROCEDURE — 93010 ELECTROCARDIOGRAM REPORT: CPT

## 2024-11-15 RX ORDER — ASPIRIN/MAG CARB/ALUMINUM AMIN 325 MG
1 TABLET ORAL
Refills: 0 | DISCHARGE

## 2024-11-15 RX ORDER — ASPIRIN/MAG CARB/ALUMINUM AMIN 325 MG
1 TABLET ORAL
Qty: 30 | Refills: 11
Start: 2024-11-15 | End: 2025-11-09

## 2024-11-15 RX ORDER — CARVEDILOL 25 MG/1
1 TABLET, FILM COATED ORAL
Refills: 0 | DISCHARGE

## 2024-11-15 RX ORDER — CLOPIDOGREL 75 MG/1
1 TABLET ORAL
Qty: 30 | Refills: 11
Start: 2024-11-15 | End: 2025-11-09

## 2024-11-15 RX ORDER — CARVEDILOL 25 MG/1
1 TABLET, FILM COATED ORAL
Qty: 60 | Refills: 2
Start: 2024-11-15 | End: 2025-02-12

## 2024-11-15 RX ADMIN — IPRATROPIUM BROMIDE AND ALBUTEROL SULFATE 3 MILLILITER(S): .5; 2.5 SOLUTION RESPIRATORY (INHALATION) at 05:03

## 2024-11-15 RX ADMIN — Medication 81 MILLIGRAM(S): at 13:52

## 2024-11-15 RX ADMIN — CLOPIDOGREL 75 MILLIGRAM(S): 75 TABLET ORAL at 13:52

## 2024-11-15 RX ADMIN — CARVEDILOL 3.12 MILLIGRAM(S): 25 TABLET, FILM COATED ORAL at 05:03

## 2024-11-15 RX ADMIN — SACUBITRIL AND VALSARTAN 1 TABLET(S): 97; 103 TABLET, FILM COATED ORAL at 05:03

## 2024-11-15 NOTE — DISCHARGE NOTE NURSING/CASE MANAGEMENT/SOCIAL WORK - FINANCIAL ASSISTANCE
[facility] provides services at a reduced cost to those who are determined to be eligible through [facility]’s financial assistance program. Information regarding [facility]’s financial assistance program can be found by going to [email or web address] or by calling [phone].

## 2024-11-15 NOTE — DISCHARGE NOTE NURSING/CASE MANAGEMENT/SOCIAL WORK - PATIENT PORTAL LINK FT
You can access the FollowMyHealth Patient Portal offered by [facility] by registering at the following website: [email or web address]. By joining [facility]’s FollowMyHealth portal, you will also be able to view your health information using other applications (apps) compatible with our system.

## 2024-11-22 DIAGNOSIS — I11.0 HYPERTENSIVE HEART DISEASE WITH HEART FAILURE: ICD-10-CM

## 2024-11-22 DIAGNOSIS — I25.10 ATHEROSCLEROTIC HEART DISEASE OF NATIVE CORONARY ARTERY WITHOUT ANGINA PECTORIS: ICD-10-CM

## 2024-11-22 DIAGNOSIS — Z79.82 LONG TERM (CURRENT) USE OF ASPIRIN: ICD-10-CM

## 2024-11-22 DIAGNOSIS — F17.210 NICOTINE DEPENDENCE, CIGARETTES, UNCOMPLICATED: ICD-10-CM

## 2024-11-22 DIAGNOSIS — R94.39 ABNORMAL RESULT OF OTHER CARDIOVASCULAR FUNCTION STUDY: ICD-10-CM

## 2024-11-22 DIAGNOSIS — I50.22 CHRONIC SYSTOLIC (CONGESTIVE) HEART FAILURE: ICD-10-CM

## 2024-11-22 DIAGNOSIS — I49.3 VENTRICULAR PREMATURE DEPOLARIZATION: ICD-10-CM

## 2024-11-22 DIAGNOSIS — E78.5 HYPERLIPIDEMIA, UNSPECIFIED: ICD-10-CM

## 2024-11-22 DIAGNOSIS — Z95.5 PRESENCE OF CORONARY ANGIOPLASTY IMPLANT AND GRAFT: ICD-10-CM

## 2024-11-26 PROCEDURE — 82553 CREATINE MB FRACTION: CPT

## 2024-11-26 PROCEDURE — 83735 ASSAY OF MAGNESIUM: CPT

## 2024-11-26 PROCEDURE — 80053 COMPREHEN METABOLIC PANEL: CPT

## 2024-11-26 PROCEDURE — C1887: CPT

## 2024-11-26 PROCEDURE — 83036 HEMOGLOBIN GLYCOSYLATED A1C: CPT

## 2024-11-26 PROCEDURE — C1725: CPT

## 2024-11-26 PROCEDURE — 93005 ELECTROCARDIOGRAM TRACING: CPT

## 2024-11-26 PROCEDURE — 85730 THROMBOPLASTIN TIME PARTIAL: CPT

## 2024-11-26 PROCEDURE — C1894: CPT

## 2024-11-26 PROCEDURE — 80061 LIPID PANEL: CPT

## 2024-11-26 PROCEDURE — 94640 AIRWAY INHALATION TREATMENT: CPT

## 2024-11-26 PROCEDURE — 85025 COMPLETE CBC W/AUTO DIFF WBC: CPT

## 2024-11-26 PROCEDURE — C1874: CPT

## 2024-11-26 PROCEDURE — 85610 PROTHROMBIN TIME: CPT

## 2024-11-26 PROCEDURE — 36415 COLL VENOUS BLD VENIPUNCTURE: CPT

## 2024-11-26 PROCEDURE — C1769: CPT

## 2024-11-26 PROCEDURE — 82550 ASSAY OF CK (CPK): CPT

## 2024-11-26 PROCEDURE — 85027 COMPLETE CBC AUTOMATED: CPT

## 2025-01-16 PROBLEM — F17.200 NICOTINE DEPENDENCE, UNSPECIFIED, UNCOMPLICATED: Chronic | Status: ACTIVE | Noted: 2024-11-11

## 2025-01-16 PROBLEM — I10 ESSENTIAL (PRIMARY) HYPERTENSION: Chronic | Status: ACTIVE | Noted: 2024-11-11

## 2025-01-16 PROBLEM — I50.22 CHRONIC SYSTOLIC (CONGESTIVE) HEART FAILURE: Chronic | Status: ACTIVE | Noted: 2024-11-11

## 2025-01-16 PROBLEM — E78.5 HYPERLIPIDEMIA, UNSPECIFIED: Chronic | Status: ACTIVE | Noted: 2024-11-11

## 2025-02-27 PROBLEM — Z00.00 ENCOUNTER FOR PREVENTIVE HEALTH EXAMINATION: Status: ACTIVE | Noted: 2025-02-27

## 2025-03-04 ENCOUNTER — APPOINTMENT (OUTPATIENT)
Dept: HEART AND VASCULAR | Facility: CLINIC | Age: 65
End: 2025-03-04

## 2025-05-13 ENCOUNTER — NON-APPOINTMENT (OUTPATIENT)
Age: 65
End: 2025-05-13

## 2025-05-13 ENCOUNTER — APPOINTMENT (OUTPATIENT)
Dept: HEART AND VASCULAR | Facility: CLINIC | Age: 65
End: 2025-05-13

## 2025-05-13 VITALS — HEART RATE: 55 BPM | OXYGEN SATURATION: 95 %

## 2025-05-13 VITALS — BODY MASS INDEX: 28.49 KG/M2 | HEIGHT: 68 IN | TEMPERATURE: 97.5 F | WEIGHT: 188 LBS

## 2025-05-13 VITALS — DIASTOLIC BLOOD PRESSURE: 53 MMHG | SYSTOLIC BLOOD PRESSURE: 92 MMHG

## 2025-05-13 DIAGNOSIS — Z82.49 FAMILY HISTORY OF ISCHEMIC HEART DISEASE AND OTHER DISEASES OF THE CIRCULATORY SYSTEM: ICD-10-CM

## 2025-05-13 DIAGNOSIS — F10.90 ALCOHOL USE, UNSPECIFIED, UNCOMPLICATED: ICD-10-CM

## 2025-05-13 DIAGNOSIS — F17.200 NICOTINE DEPENDENCE, UNSPECIFIED, UNCOMPLICATED: ICD-10-CM

## 2025-05-13 PROCEDURE — ZZZZZ: CPT

## 2025-05-13 RX ORDER — CARVEDILOL 12.5 MG/1
12.5 TABLET, FILM COATED ORAL
Refills: 0 | Status: ACTIVE | COMMUNITY

## 2025-05-13 RX ORDER — SACUBITRIL AND VALSARTAN 97; 103 MG/1; MG/1
97-103 TABLET, FILM COATED ORAL
Refills: 0 | Status: ACTIVE | COMMUNITY

## 2025-05-13 RX ORDER — ASPIRIN 81 MG
81 TABLET, DELAYED RELEASE (ENTERIC COATED) ORAL
Refills: 0 | Status: ACTIVE | COMMUNITY

## 2025-05-13 RX ORDER — ICOSAPENT ETHYL 1000 MG/1
1 CAPSULE ORAL
Refills: 0 | Status: ACTIVE | COMMUNITY

## 2025-05-13 RX ORDER — AMLODIPINE BESYLATE 5 MG/1
5 TABLET ORAL
Refills: 0 | Status: ACTIVE | COMMUNITY

## 2025-05-13 RX ORDER — CHROMIUM 200 MCG
TABLET ORAL
Refills: 0 | Status: ACTIVE | COMMUNITY

## 2025-05-13 RX ORDER — DAPAGLIFLOZIN 10 MG/1
10 TABLET, FILM COATED ORAL
Refills: 0 | Status: ACTIVE | COMMUNITY

## 2025-05-13 RX ORDER — CLOPIDOGREL BISULFATE 75 MG/1
75 TABLET, FILM COATED ORAL
Refills: 0 | Status: ACTIVE | COMMUNITY

## 2025-05-13 RX ORDER — ATORVASTATIN CALCIUM 80 MG/1
80 TABLET, FILM COATED ORAL
Refills: 0 | Status: ACTIVE | COMMUNITY

## 2025-06-16 ENCOUNTER — INPATIENT (INPATIENT)
Facility: HOSPITAL | Age: 65
LOS: 0 days | Discharge: HOME | End: 2025-06-17
Attending: INTERNAL MEDICINE | Admitting: INTERNAL MEDICINE
Payer: MEDICARE

## 2025-06-16 VITALS
DIASTOLIC BLOOD PRESSURE: 78 MMHG | TEMPERATURE: 97 F | HEIGHT: 68 IN | WEIGHT: 182.98 LBS | HEART RATE: 58 BPM | SYSTOLIC BLOOD PRESSURE: 105 MMHG | RESPIRATION RATE: 16 BRPM | OXYGEN SATURATION: 98 %

## 2025-06-16 PROCEDURE — 93010 ELECTROCARDIOGRAM REPORT: CPT

## 2025-06-16 PROCEDURE — 33249 INSJ/RPLCMT DEFIB W/LEAD(S): CPT | Mod: 59

## 2025-06-16 RX ORDER — ATORVASTATIN CALCIUM 80 MG/1
1 TABLET, FILM COATED ORAL
Refills: 0 | DISCHARGE

## 2025-06-16 RX ORDER — ASPIRIN 325 MG
81 TABLET ORAL DAILY
Refills: 0 | Status: DISCONTINUED | OUTPATIENT
Start: 2025-06-16 | End: 2025-06-17

## 2025-06-16 RX ORDER — AMLODIPINE BESYLATE 10 MG/1
1 TABLET ORAL
Refills: 0 | DISCHARGE

## 2025-06-16 RX ORDER — SACUBITRIL AND VALSARTAN 49; 51 MG/1; MG/1
1 TABLET, FILM COATED ORAL
Refills: 0 | Status: DISCONTINUED | OUTPATIENT
Start: 2025-06-16 | End: 2025-06-17

## 2025-06-16 RX ORDER — DAPAGLIFLOZIN 5 MG/1
1 TABLET, FILM COATED ORAL
Refills: 0 | DISCHARGE

## 2025-06-16 RX ORDER — ACETAMINOPHEN 500 MG/5ML
650 LIQUID (ML) ORAL EVERY 6 HOURS
Refills: 0 | Status: DISCONTINUED | OUTPATIENT
Start: 2025-06-16 | End: 2025-06-17

## 2025-06-16 RX ORDER — CEFAZOLIN SODIUM IN 0.9 % NACL 3 G/100 ML
2000 INTRAVENOUS SOLUTION, PIGGYBACK (ML) INTRAVENOUS ONCE
Refills: 0 | Status: COMPLETED | OUTPATIENT
Start: 2025-06-16 | End: 2025-06-16

## 2025-06-16 RX ORDER — ATORVASTATIN CALCIUM 80 MG/1
80 TABLET, FILM COATED ORAL AT BEDTIME
Refills: 0 | Status: DISCONTINUED | OUTPATIENT
Start: 2025-06-16 | End: 2025-06-17

## 2025-06-16 RX ORDER — AMLODIPINE BESYLATE 10 MG/1
5 TABLET ORAL DAILY
Refills: 0 | Status: DISCONTINUED | OUTPATIENT
Start: 2025-06-16 | End: 2025-06-17

## 2025-06-16 RX ADMIN — Medication 650 MILLIGRAM(S): at 22:50

## 2025-06-16 RX ADMIN — ATORVASTATIN CALCIUM 80 MILLIGRAM(S): 80 TABLET, FILM COATED ORAL at 21:22

## 2025-06-16 RX ADMIN — Medication 650 MILLIGRAM(S): at 23:50

## 2025-06-16 NOTE — H&P ADULT - HISTORY OF PRESENT ILLNESS
Mr. Johnson is a pleasant 64 year old gentleman with a past medical history significant for HTN, HLD, NSVT (on OP monitor), CAD s/p BILL (2022) & PCI/BILL x 1 Ramus (11/14/24, Dr. Crowder) and chronic systolic CHF (EF 25% per stress) who presents to the EP lab for ICD implant.   ?  Post cardiac catheterization patient was noted to have ventricular ectopy. Coreg has been increased.  ?  Repeat Echo with Dr. Crowder 2/5/25 significant for persistently reduced EF 25%. He is on GDMT as per Dr. Crowder.  ?  He notes brief episodes of palpitations a few times per week. No CP, presyncope/syncope.  ?  Exertional tolerance 5-6 blocks. Mr. Johnson is a pleasant 64 year old gentleman with a past medical history significant for HTN, HLD, NSVT (on OP monitor), CAD s/p BILL () & PCI/BILL x 1 Ramus (24, Dr. Crowder) and chronic systolic CHF (EF 25% per stress) who presents to the EP lab for ICD implant.   ?  Post cardiac catheterization patient was noted to have ventricular ectopy. Coreg has been increased.  ?  Repeat Echo with Dr. Crowder 25 significant for persistently reduced EF 25%. He is on GDMT as per Dr. Crowder.  ?  He notes brief episodes of palpitations a few times per week. No CP, presyncope/syncope.  ?  Exertional tolerance 5-6 blocks.    Review of EC/2025 SR RAD LBBB QRS 138ms   2025 SR QRS 120ms   2025 SR QRS 135ms

## 2025-06-16 NOTE — PRE-ANESTHESIA EVALUATION ADULT - NSANTHOSAYNRD_GEN_A_CORE
No. ASHANTI screening performed.  STOP BANG Legend: 0-2 = LOW Risk; 3-4 = INTERMEDIATE Risk; 5-8 = HIGH Risk

## 2025-06-16 NOTE — PRE-ANESTHESIA EVALUATION ADULT - NSANTHPMHFT_GEN_ALL_CORE
Additional PMHx: CAD (s/p BILL 2022 and PCI BILL 11/24/24), Hx NSVT    PSxHx: Cardiac Cath X 2, Colonoscopies

## 2025-06-16 NOTE — H&P ADULT - ASSESSMENT
Mr. Batres presents to the EP lab for ICD implant.   His last dose of farxiga was 6 days ago.      Mr. Batres presents to the EP lab for BIV ICD implant.   His last dose of farxiga was 6 days ago.

## 2025-06-17 ENCOUNTER — TRANSCRIPTION ENCOUNTER (OUTPATIENT)
Age: 65
End: 2025-06-17

## 2025-06-17 VITALS
HEART RATE: 61 BPM | OXYGEN SATURATION: 97 % | RESPIRATION RATE: 18 BRPM | SYSTOLIC BLOOD PRESSURE: 124 MMHG | DIASTOLIC BLOOD PRESSURE: 72 MMHG | TEMPERATURE: 98 F

## 2025-06-17 PROCEDURE — 82803 BLOOD GASES ANY COMBINATION: CPT

## 2025-06-17 PROCEDURE — 82947 ASSAY GLUCOSE BLOOD QUANT: CPT

## 2025-06-17 PROCEDURE — 84295 ASSAY OF SERUM SODIUM: CPT

## 2025-06-17 PROCEDURE — C1777: CPT

## 2025-06-17 PROCEDURE — 85610 PROTHROMBIN TIME: CPT

## 2025-06-17 PROCEDURE — C1722: CPT

## 2025-06-17 PROCEDURE — 99239 HOSP IP/OBS DSCHRG MGMT >30: CPT | Mod: 24

## 2025-06-17 PROCEDURE — 82330 ASSAY OF CALCIUM: CPT

## 2025-06-17 PROCEDURE — 71046 X-RAY EXAM CHEST 2 VIEWS: CPT

## 2025-06-17 PROCEDURE — 84132 ASSAY OF SERUM POTASSIUM: CPT

## 2025-06-17 PROCEDURE — 71046 X-RAY EXAM CHEST 2 VIEWS: CPT | Mod: 26

## 2025-06-17 PROCEDURE — C1769: CPT

## 2025-06-17 PROCEDURE — 93005 ELECTROCARDIOGRAM TRACING: CPT

## 2025-06-17 PROCEDURE — C1892: CPT

## 2025-06-17 PROCEDURE — 85014 HEMATOCRIT: CPT

## 2025-06-17 PROCEDURE — 82565 ASSAY OF CREATININE: CPT

## 2025-06-17 RX ADMIN — Medication 81 MILLIGRAM(S): at 10:48

## 2025-06-17 RX ADMIN — Medication 650 MILLIGRAM(S): at 06:56

## 2025-06-17 RX ADMIN — Medication 650 MILLIGRAM(S): at 05:56

## 2025-06-17 RX ADMIN — SACUBITRIL AND VALSARTAN 1 TABLET(S): 49; 51 TABLET, FILM COATED ORAL at 05:55

## 2025-06-17 RX ADMIN — AMLODIPINE BESYLATE 5 MILLIGRAM(S): 10 TABLET ORAL at 05:54

## 2025-06-17 NOTE — DISCHARGE NOTE PROVIDER - NSDCFUADDINST_GEN_ALL_CORE_FT
- Because you have received sedation for your procedure, DO NOT: drive, operate machinery, consume alcohol, or make important decisions for 24 hours after your procedure.    - Unless instructed otherwise, leave the outer clear plastic dressing with white gauze (tegaderm) in place for 24 hours after your procedure. The glue under the dressing will take weeks to fully come off – DO Not pick, rub or remove this glue (pat dry with shower). All the stitches used during your procedure will dissolve on their own. If skin irritation is noted from around the bandage site (e.g., redness, blistering, skin breakdown, etc.), contact the Electrophysiology Office at [phone]    - Do not shower for 2 days following your implant. During this time, sponge baths are permissible providing the incision site remains dry. Complete submersion of the incision (i.e., tub baths, swimming) should be avoided for at least 4 weeks / after wound check.    o Do not use any ointments or creams (Including prescriptions) on the incision area.    - Avoid heavy lifting with the procedural side arm of > 10 pounds for 3 weeks. Also, avoid sudden jerking movements on the procedural side arm as well as avoiding upper body exercises for 4 weeks, as these movements can interfere with wound healing and may cause lead dislodgement.    - Contact the Electrophysiology Office ([phone]) or notify your physician if you notice ANY of the following:    o Bleeding or discharge from incision    o increased discomfort    o skin irritation    o signs of infection including fever > 101.0 F.    - some tenderness and swelling by the procedure site is normal. If the incision site gets harder or larger then discharge, please call us. Your skin will likely turn black and blue, and this can get worse before it gets better. As long as the area remains soft this is a normal part of the healing process.    - Please call [phone] to make a follow up appointment (wound check 2-3 weeks post discharge) if you were not given a discharge appointment    - For technological monitoring issues, please call the company who makes your device  o [employer] [phone] - Because you have received sedation for your procedure, DO NOT: drive, operate machinery, consume alcohol, or make important decisions for 24 hours after your procedure.    - Unless instructed otherwise, leave the outer clear plastic dressing with white gauze (tegaderm) in place for 24 hours after your procedure. The glue under the dressing will take weeks to fully come off – DO Not pick, rub or remove this glue (pat dry with shower). All the stitches used during your procedure will dissolve on their own. If skin irritation is noted from around the bandage site (e.g., redness, blistering, skin breakdown, etc.), contact the Electrophysiology Office at [phone]    - Do not shower for 2 days following your implant. During this time, sponge baths are permissible providing the incision site remains dry. Complete submersion of the incision (i.e., tub baths, swimming) should be avoided for at least 4 weeks / after wound check.    o Do not use any ointments or creams (Including prescriptions) on the incision area.    - Avoid heavy lifting with the procedural side arm of > 5 pounds for 3 weeks. Also, avoid sudden jerking movements on the procedural side arm as well as avoiding upper body exercises for 4 weeks, as these movements can interfere with wound healing and may cause lead dislodgement.    - Contact the Electrophysiology Office ([phone]) or notify your physician if you notice ANY of the following:    o Bleeding or discharge from incision    o increased discomfort    o skin irritation    o signs of infection including fever > 101.0 F.    - some tenderness and swelling by the procedure site is normal. If the incision site gets harder or larger then discharge, please call us. Your skin will likely turn black and blue, and this can get worse before it gets better. As long as the area remains soft this is a normal part of the healing process.    - Please call [phone] to make a follow up appointment (wound check 2-3 weeks post discharge) if you were not given a discharge appointment    - For technological monitoring issues, please call the company who makes your device  o [employer] [phone]

## 2025-06-17 NOTE — DISCHARGE NOTE PROVIDER - HOSPITAL COURSE
64 year old gentleman with a past medical history significant for HTN, HLD, NSVT (on OP monitor), CAD s/p BILL (2022) & PCI/BILL x 1 Ramus (11/14/24, Dr. Crowder) and chronic systolic CHF (EF 25% per stress) is now s/p single chamber ICD implant 6/16 as EF remains persistently low despite GDMT. CXR 6/17 **** Patient without any complaints. Incision site clean, dry, intact. Gauze removed.     Appearance: No acute distress, well developed  Eyes: normal appearing conjunctiva, pupils and eyelids  Cardiovascular: Normal S1 S2, No JVD, No murmurs, No edema  Respiratory: No increased work of breathing  Gastrointestinal:  Soft, NT/ND 	  Neurologic:  No deficit noted  Psych: A&Ox3, normal mood/affect     64 year old gentleman with a past medical history significant for HTN, HLD, NSVT (on OP monitor), CAD s/p BILL (2022) & PCI/BILL x 1 Ramus (11/14/24, Dr. Crowder) and chronic systolic CHF (EF 25% per stress) is now s/p single chamber ICD implant 6/16 as EF remains persistently low despite GDMT. CXR 6/17 with ICD lead in good position, no evidence of pneumothorax.**** Patient without any complaints. Incision site clean, dry, intact. Gauze removed.     Appearance: No acute distress, well developed  Eyes: normal appearing conjunctiva, pupils and eyelids  Cardiovascular: Normal S1 S2, No JVD, No murmurs, No edema  Respiratory: No increased work of breathing  Gastrointestinal:  Soft, NT/ND 	  Neurologic:  No deficit noted  Psych: A&Ox3, normal mood/affect     64 year old gentleman with a past medical history significant for HTN, HLD, NSVT (on OP monitor), CAD s/p BILL (2022) & PCI/BILL x 1 Ramus (11/14/24, Dr. Crowder) and chronic systolic CHF (EF 25% per stress) is now s/p single chamber ICD implant 6/16 as EF remains persistently low despite GDMT. CXR 6/17 with ICD lead in good position, no evidence of pneumothorax. Patient without any complaints. Incision site clean, dry, intact. Gauze removed. Post-op care instructions discussed and given. Patient to follow up in wound care clinic in 4 weeks.     Appearance: No acute distress, well developed  Eyes: normal appearing conjunctiva, pupils and eyelids  Cardiovascular: Normal S1 S2  Respiratory: No increased work of breathing  Gastrointestinal:  Soft, NT/ND 	  Neurologic:  No deficit noted  Psych: A&Ox3, normal mood/affect

## 2025-06-17 NOTE — DISCHARGE NOTE NURSING/CASE MANAGEMENT/SOCIAL WORK - FINANCIAL ASSISTANCE
[facility] provides services at a reduced cost to those who are determined to be eligible through [facility]’s financial assistance program. Information regarding [facility]’s financial assistance program can be found by going to [email or web address] or by calling [phone].

## 2025-06-17 NOTE — DISCHARGE NOTE NURSING/CASE MANAGEMENT/SOCIAL WORK - PATIENT PORTAL LINK FT
You can access the FollowMyHealth Patient Portal offered by [facility] by registering at the following website: [email or web address]. By joining [employer]’s FollowMyHealth portal, you will also be able to view your health information using other applications (apps) compatible with our system.

## 2025-06-17 NOTE — DISCHARGE NOTE PROVIDER - CARE PROVIDER_API CALL
Mira Moy  Nurse Practitioner Acute Care  [address] [place] [id number]  Phone: [phone]  Fax: [phone]  Scheduled Appointment: 07/16/2025 02:00 PM

## 2025-06-17 NOTE — DISCHARGE NOTE PROVIDER - NSDCMRMEDTOKEN_GEN_ALL_CORE_FT
amLODIPine 5 mg oral tablet: 1 tab(s) orally once a day  Aspirin EC 81 mg oral delayed release tablet: 1 tab(s) orally once a day  atorvastatin 80 mg oral tablet: 1 tab(s) orally once a day  Entresto 24 mg-26 mg oral tablet: 1 tab(s) orally 2 times a day  Farxiga 10 mg oral tablet: 1 tab(s) orally once a day  Vascepa 1 g oral capsule: 2 cap(s) orally 2 times a day

## 2025-06-17 NOTE — DISCHARGE NOTE PROVIDER - NSDCFUSCHEDAPPT_GEN_ALL_CORE_FT
Mira Moy  [facility] Physician [facility]  [facility] [address]  Scheduled Appointment: 07/16/2025    [facility]  [facility] [address]  Scheduled Appointment: 08/20/2025

## 2025-06-20 DIAGNOSIS — I25.10 ATHEROSCLEROTIC HEART DISEASE OF NATIVE CORONARY ARTERY WITHOUT ANGINA PECTORIS: ICD-10-CM

## 2025-06-20 DIAGNOSIS — I50.22 CHRONIC SYSTOLIC (CONGESTIVE) HEART FAILURE: ICD-10-CM

## 2025-06-20 DIAGNOSIS — Z79.82 LONG TERM (CURRENT) USE OF ASPIRIN: ICD-10-CM

## 2025-06-20 DIAGNOSIS — I25.5 ISCHEMIC CARDIOMYOPATHY: ICD-10-CM

## 2025-06-20 DIAGNOSIS — E78.5 HYPERLIPIDEMIA, UNSPECIFIED: ICD-10-CM

## 2025-06-20 DIAGNOSIS — Z95.5 PRESENCE OF CORONARY ANGIOPLASTY IMPLANT AND GRAFT: ICD-10-CM

## 2025-06-20 DIAGNOSIS — I11.0 HYPERTENSIVE HEART DISEASE WITH HEART FAILURE: ICD-10-CM

## 2025-06-20 DIAGNOSIS — I49.3 VENTRICULAR PREMATURE DEPOLARIZATION: ICD-10-CM

## 2025-06-20 DIAGNOSIS — Z87.891 PERSONAL HISTORY OF NICOTINE DEPENDENCE: ICD-10-CM

## 2025-07-16 ENCOUNTER — APPOINTMENT (OUTPATIENT)
Dept: HEART AND VASCULAR | Facility: CLINIC | Age: 65
End: 2025-07-16

## 2025-07-16 VITALS
WEIGHT: 191 LBS | SYSTOLIC BLOOD PRESSURE: 126 MMHG | HEART RATE: 65 BPM | HEIGHT: 68 IN | DIASTOLIC BLOOD PRESSURE: 69 MMHG | BODY MASS INDEX: 28.95 KG/M2 | OXYGEN SATURATION: 95 % | TEMPERATURE: 97.8 F

## 2025-07-16 PROCEDURE — 93282 PRGRMG EVAL IMPLANTABLE DFB: CPT

## 2025-07-31 PROBLEM — Z95.810 ICD (IMPLANTABLE CARDIOVERTER-DEFIBRILLATOR) IN PLACE: Status: ACTIVE | Noted: 2025-07-31

## 2025-07-31 PROBLEM — I25.5 CARDIOMYOPATHY, ISCHEMIC: Status: ACTIVE | Noted: 2025-07-31

## 2025-08-20 ENCOUNTER — APPOINTMENT (OUTPATIENT)
Dept: HEART AND VASCULAR | Facility: CLINIC | Age: 65
End: 2025-08-20
Payer: MEDICARE

## 2025-08-20 ENCOUNTER — NON-APPOINTMENT (OUTPATIENT)
Age: 65
End: 2025-08-20

## 2025-08-20 PROCEDURE — 93297 REM INTERROG DEV EVAL ICPMS: CPT
